# Patient Record
Sex: MALE | Race: OTHER | HISPANIC OR LATINO
[De-identification: names, ages, dates, MRNs, and addresses within clinical notes are randomized per-mention and may not be internally consistent; named-entity substitution may affect disease eponyms.]

---

## 2021-04-23 PROBLEM — Z00.00 ENCOUNTER FOR PREVENTIVE HEALTH EXAMINATION: Status: ACTIVE | Noted: 2021-04-23

## 2021-04-24 ENCOUNTER — APPOINTMENT (OUTPATIENT)
Dept: DISASTER EMERGENCY | Facility: CLINIC | Age: 81
End: 2021-04-24

## 2021-04-24 PROBLEM — I10 ESSENTIAL (PRIMARY) HYPERTENSION: Chronic | Status: ACTIVE | Noted: 2020-02-06

## 2021-04-24 PROBLEM — E78.5 HYPERLIPIDEMIA, UNSPECIFIED: Chronic | Status: ACTIVE | Noted: 2020-02-06

## 2021-04-24 PROBLEM — I50.22 CHRONIC SYSTOLIC (CONGESTIVE) HEART FAILURE: Chronic | Status: ACTIVE | Noted: 2020-02-06

## 2021-04-24 PROBLEM — E11.9 TYPE 2 DIABETES MELLITUS WITHOUT COMPLICATIONS: Chronic | Status: ACTIVE | Noted: 2020-02-06

## 2021-04-24 PROBLEM — F10.10 ALCOHOL ABUSE, UNCOMPLICATED: Chronic | Status: ACTIVE | Noted: 2020-02-06

## 2021-04-26 VITALS
DIASTOLIC BLOOD PRESSURE: 93 MMHG | SYSTOLIC BLOOD PRESSURE: 180 MMHG | HEART RATE: 70 BPM | OXYGEN SATURATION: 96 % | RESPIRATION RATE: 16 BRPM

## 2021-04-26 RX ORDER — CHLORHEXIDINE GLUCONATE 213 G/1000ML
1 SOLUTION TOPICAL ONCE
Refills: 0 | Status: DISCONTINUED | OUTPATIENT
Start: 2021-05-18 | End: 2021-05-19

## 2021-04-26 NOTE — H&P ADULT - NSICDXPASTMEDICALHX_GEN_ALL_CORE_FT
PAST MEDICAL HISTORY:  Alcohol abuse     Chronic systolic heart failure     Hyperlipidemia     Hypertension     Type 2 diabetes mellitus

## 2021-04-26 NOTE — H&P ADULT - HISTORY OF PRESENT ILLNESS
SKELETON    79 yo Male, PMHx HTN, Diabetes      SKELETON    79 yo Male, PMHx HTN, Hyperlipidemia, Diabetes, BPH presented to cardiologist, Dr Erin Barber, endorsing LE swelling and pain with exertion worsening over the last few months. Pt states his exercise tolerance is 3 blocks. Pt also feels lightheaded when he goes from a seated to standing position. Denies....     Pharm     [facility]    Cardiologist: Dr Rao  Escort:  Pharmacy:  COVD PCR: not performed    81 yo Male, PMHx HTN, Hyperlipidemia, Diabetes, BPH presented to cardiologist, Dr Erin Barber, endorsing LE swelling and pain with exertion worsening over the last few months. Pt states his exercise tolerance is 3 blocks. Pt also feels lightheaded when he goes from a seated to standing position. Denies....     Pharm NST on 03/13/21 showed a medium sized, mild to moderate severity, partially reversible defect exists in the proximal and distal inferior segments and apex.      SKELETON    Cardiologist: Dr Rao  Escort:  Pharmacy:  COVD PCR: not performed    79 yo Male, PMHx HTN, Hyperlipidemia, Diabetes, BPH, ?CHF (h/o says chronic systolic CHF, most recent EF 47% from NST 03/2021) presented to cardiologist, Dr Erin Barber, endorsing LE swelling and pain with exertion worsening over the last few months. Pt states his exercise tolerance is 3 blocks. Pt also feels lightheaded when he goes from a seated to standing position. Denies....     Pharm NST on 03/13/21 showed a medium sized, mild to moderate severity, partially reversible defect exists in the proximal and distal inferior segments and apex. Pt then underwent a diagnostic cardiac catheterization at [facility] on 04/23/21 with unsuccessful intervention to distal RCA 80%, severely calcified. Residual disease includes distal LAD diffusely disease 80%. Mild luminal irregularities in p/m LAD, RPDA. Right radial access.    In light of pt's risk factors, CCS Class II anginal symptoms, abnormal NST, and known distal RCA and LAD disease pt presents for recommended cardiac catheterization with atherectomy/PCI of known disease.  SKELETON    Cardiologist: Dr Rao  Escort:  Pharmacy:  COVD PCR: not performed    79 yo Male, PMHx HTN, Hyperlipidemia, Diabetes, BPH, ?CHF (h/o says chronic systolic CHF, most recent EF 47% from NST 03/2021) presented to cardiologist, Dr Erin Barber, endorsing LE swelling and pain with exertion worsening over the last few months. Pt states his exercise tolerance is 3 blocks. Pt also feels lightheaded when he goes from a seated to standing position. Denies....     Pharm NST on 03/13/21 showed a medium sized, mild to moderate severity, partially reversible defect exists in the proximal and distal inferior segments and apex. Pt then underwent a diagnostic cardiac catheterization at [facility] on 04/23/21 with unsuccessful intervention to distal RCA 80%, severely calcified. Residual disease includes distal LAD diffusely disease 80%. Mild luminal irregularities in p/m LAD, RPDA. Right radial access.    Of note pt was recently changed from Norvasc and Metoprolol to Carvedilol. Pt not on an ACE/ARB 2/2 elevated K as outpt.     In light of pt's risk factors, CCS Class II anginal symptoms, abnormal NST, and known distal RCA and LAD disease pt presents for recommended cardiac catheterization with atherectomy/PCI of known disease.  Cardiologist: Dr Jalen Clayort: wife  Pharmacy: ____confirm on arrival  COVD PCR: ****confirm with patient Monday before procedure        ****POOR HISTORIAN; NEEDS PRE-MED W/ SOLUCORTEF (severe shellfish allergy; did not send prednisone); CONFIRM MEDS AND PHARMACY; Consider CIWA if admitted, as pt drinks ~ 1 pint of Bacardi daily.         81 yo POOR HISTORIAN Male w/ Alcohol abuse and  PMHx HTN, HLD, DM, BPH, CHF (h/o says chronic systolic CHF, most recent EF 47% from NST 03/2021) presented to cardiologist, Dr Erin Barber, endorsing LE swelling and pain with exertion worsening over the last few months. Pt states his exercise tolerance is 3 blocks. Pt also feels lightheaded when he goes from a seated to standing position. Denies CP, palpitations, abdominal pain, orthopnea, PND, syncope.     Pharm NST on 03/13/21 showed a medium sized, mild to moderate severity, partially reversible defect exists in the proximal and distal inferior segments and apex. Pt then underwent a diagnostic cardiac catheterization at [facility] on 04/23/21 with unsuccessful intervention to distal RCA 80%, severely calcified. Residual disease includes distal LAD diffusely disease 80%. Mild luminal irregularities in p/m LAD, RPDA.     Of note pt was recently changed from Norvasc and Metoprolol to Carvedilol. Pt not on an ACE/ARB 2/2 elevated K as outpt.     In light of pt's risk factors, CCS Class II anginal symptoms, abnormal NST, and known distal RCA and LAD disease pt presents for recommended cardiac catheterization with atherectomy/PCI of known disease.   Cardiologist: Dr Jalen Clayort: wife  Pharmacy: ____confirm on arrival  COVD PCR: ****confirm with patient Monday before procedure        ****POOR HISTORIAN; NEEDS PRE-MED W/ SOLUCORTEF (severe shellfish allergy; did not send prednisone); CONFIRM MEDS AND PHARMACY; Consider CIWA if admitted, as pt drinks ~ 1 pint of Bacardi daily.         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 Pt states his exercise tolerance is 3 blocks. Pt also feels lightheaded when he goes from a seated to standing position. Denies CP, palpitations, abdominal pain, orthopnea, PND, syncope.  Of note pt was recently changed from Norvasc and Metoprolol to Carvedilol. Pt not on an ACE/ARB 2/2 elevated K as outpt.     Pharm NST on 03/13/21 showed a medium sized, mild to moderate severity, partially reversible defect exists in the proximal and distal inferior segments and apex. Pt then underwent a diagnostic cardiac catheterization at [facility] on 04/23/21 with unsuccessful intervention to distal RCA 80%, severely calcified. Residual disease includes distal LAD diffusely disease 80%. Mild luminal irregularities in p/m LAD, RPDA.     In light of pt's risk factors, CCS Class II anginal symptoms, abnormal NST, and known distal RCA and LAD disease pt presents for recommended cardiac catheterization with atherectomy/PCI of known disease.   Pt would like to reschedule from 5/10/21, alerted Tia RN    Cardiologist: Dr Rao  Escort: wife  Pharmacy: ____confirm on arrival  COVD PCR: ****confirm with patient Monday before procedure        ****POOR HISTORIAN; NEEDS PRE-MED W/ SOLUCORTEF (severe shellfish allergy; did not send prednisone); CONFIRM MEDS AND PHARMACY; Consider CIWA if admitted, as pt drinks ~ 1 pint of Bacardi daily.       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 Pt states his exercise tolerance is 3 blocks. Pt also feels lightheaded when he goes from a seated to standing position. Denies CP, SOB, palpitations, abdominal pain, orthopnea, PND, syncope.  Of note pt was recently changed from Norvasc and Metoprolol to Carvedilol. Pt not on an ACE/ARB 2/2 elevated K as outpt.     NST on 03/13/21 showed a medium sized, mild to moderate severity, partially reversible defect exists in the proximal and distal inferior segments and apex. Pt then underwent a diagnostic cardiac catheterization at [facility] on 04/23/21 with unsuccessful intervention to distal RCA 80%, severely calcified. Residual disease includes distal LAD diffusely disease 80%. Mild luminal irregularities in p/m LAD, RPDA.     In light of pt's risk factors, CCS Class II anginal symptoms, abnormal NST, known distal RCA and LAD disease pt presents for recommended cardiac catheterization with atherectomy/PCI of known disease.   Rescheduled from 5/10/21 to 5/17    Cardiologist: Dr Rao  Escort: wife  Pharmacy: ____confirm on arrival  COVD PCR: Neg in [facility] 5/17         ****POOR HISTORIAN; NEEDS PRE-MED W/ SOLUCORTEF (severe shellfish allergy; did not send prednisone); CONFIRM MEDS AND PHARMACY; Consider CIWA if admitted, as pt drinks ~ 1 pint of Bacardi daily.       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 Pt states his exercise tolerance is 3 blocks. Pt also feels lightheaded when he goes from a seated to standing position. Denies CP, SOB, palpitations, abdominal pain, orthopnea, PND, syncope.  Of note pt was recently changed from Norvasc and Metoprolol to Carvedilol. Pt not on an ACE/ARB 2/2 elevated K as outpt.  NST on 03/13/21 showed a medium sized, mild to moderate severity, partially reversible defect exists in the proximal and distal inferior segments and apex. Pt then underwent a diagnostic cardiac catheterization at [facility] on 04/23/21 with unsuccessful intervention to distal RCA 80%, severely calcified. Residual disease includes distal LAD diffusely disease 80%. Mild luminal irregularities in p/m LAD, RPDA.     In light of pt's risk factors, CCS Class II anginal symptoms, abnormal NST, known distal RCA and LAD disease pt presents for recommended cardiac catheterization with atherectomy/PCI of known disease.   Rescheduled from 5/10/21 to 5/17    Cardiologist: Dr Rao  Escort: wife  Pharmacy: in [facility] PCR: Neg in [facility]IE 5/17     SENT PREDNISONE 50mg to patients pharmacy- unclear if patient has an allergy to shellfish/contrast dye, poor historian. Patient agreeable to take as prescribed.   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 Pt states his exercise tolerance is 3 blocks. Pt also feels lightheaded when he goes from a seated to standing position. Denies CP, SOB, palpitations, abdominal pain, orthopnea, PND, syncope.  Of note pt was recently changed from Norvasc and Metoprolol to Carvedilol. Pt not on an ACE/ARB 2/2 elevated K as outpt.  NST on 03/13/21 showed a medium sized, mild to moderate severity, partially reversible defect exists in the proximal and distal inferior segments and apex. Pt then underwent a diagnostic cardiac catheterization at [facility] on 04/23/21 with unsuccessful intervention to distal RCA 80%, severely calcified. Residual disease includes distal LAD diffusely disease 80%. Mild luminal irregularities in p/m LAD, RPDA.     In light of pt's risk factors, CCS Class II anginal symptoms, abnormal NST, known distal RCA and LAD disease pt presents for recommended cardiac catheterization with atherectomy/PCI of known disease.       Cardiologist: Dr Rao  Escort: wife  Pharmacy: in [facility] PCR: Neg in [facility] 5/17     SENT PREDNISONE 50mg to patients pharmacy- unclear if patient has an allergy to shellfish/contrast dye, poor historian. Patient agreeable to take as prescribed.   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 Pt states his exercise tolerance is 3 blocks. Pt also feels lightheaded when he goes from a seated to standing position. Denies CP, SOB, palpitations, abdominal pain, orthopnea, PND, syncope.  Of note pt was recently changed from Norvasc and Metoprolol to Carvedilol. Pt not on an ACE/ARB 2/2 elevated K as outpt.  NST on 03/13/21 showed a medium sized, mild to moderate severity, partially reversible defect exists in the proximal and distal inferior segments and apex. Pt then underwent a diagnostic cardiac catheterization at [facility] on 04/23/21 with unsuccessful intervention to distal RCA 80%, severely calcified. Residual disease includes distal LAD diffusely disease 80%. Mild luminal irregularities in p/m LAD, RPDA.     In light of pt's risk factors, CCS Class II anginal symptoms, abnormal NST, known distal RCA and LAD disease pt presents for recommended cardiac catheterization with atherectomy/PCI of known disease.       Cardiologist: Dr Rao  Escort: wife  Pharmacy: in [facility] PCR: Neg in [facility] 5/17     contrast dye allergy : rxn (rash/itching); prednisone 50 mg Po x 3 sent to pt pharmacy.   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 Pt states his exercise tolerance is 3 blocks. Pt also feels lightheaded when he goes from a seated to standing position. Denies CP, SOB, palpitations, abdominal pain, orthopnea, PND, syncope.  Of note pt was recently changed from Norvasc and Metoprolol to Carvedilol. Pt not on an ACE/ARB 2/2 elevated K as outpt.  NST on 03/13/21 showed a medium sized, mild to moderate severity, partially reversible defect exists in the proximal and distal inferior segments and apex. Pt then underwent a diagnostic cardiac catheterization at [facility] on 04/23/21 with unsuccessful intervention to distal RCA 80%, severely calcified. Residual disease includes distal LAD diffusely disease 80%. Mild luminal irregularities in p/m LAD, RPDA.     In light of pt's risk factors, CCS Class II anginal symptoms, abnormal NST, known distal RCA and LAD disease pt presents for recommended cardiac catheterization with atherectomy/PCI of known disease.

## 2021-04-26 NOTE — H&P ADULT - RS GEN PE MLT RESP DETAILS PC
normal/airway patent/breath sounds equal/good air movement/respirations non-labored/clear to auscultation bilaterally/no chest wall tenderness/no intercostal retractions/no rales/no rhonchi

## 2021-04-26 NOTE — H&P ADULT - NSHPSOCIALHISTORY_GEN_ALL_CORE
former tobacco user; smoked 1/2 PPD X 1 week for 8-9 years  current daily ETOH user; drinks 9 ounce of bacardi daily; last use yesterday  denies any illicit drug use

## 2021-04-26 NOTE — H&P ADULT - ASSESSMENT
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 Residual disease includes distal LAD diffusely disease 80%. Mild luminal irregularities in p/m LAD, RPDA who now presents for staged intervention of known distal RCA and LAD disease    H/H . Pt denies bleeding, GI bleeding, hematemesis, hematuria, BRBPR or melena . ASA … and Plavix … pre-cath.  Cr. IV NS@ cc/hr pre-cath.    Risks & benefits of procedure and alternative therapy have been explained to the patient including but not limited to: allergic reaction, bleeding w/possible need for blood transfusion, infection, renal and vascular compromise, limb damage, arrhythmia, stroke, vessel dissection/perforation, Myocardial infarction, emergent CABG. 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 Residual disease includes distal LAD diffusely disease 80%. Mild luminal irregularities in p/m LAD, RPDA who now presents for staged intervention of known distal RCA and LAD disease    H/H 14/42. Pt denies bleeding, GI bleeding, hematemesis, hematuria, BRBPR or melena . Pt reports being compliant with home DAPT. Pt givem ASA 81 mg Po x 1 and Plavix 75 mg PO x 1 pre-cath.  Cr. IV NS@ 75 cc/hr pre-cath.  contrast dye allergy : rxn (rash/itching); prednisone 50 mg Po x 3 sent to pt pharmacy; last took at 5: 30 am today; pt given additional prednisone 50 mg Po x1 and Benadryl 50 IV X 1 on call to cath lab   Of note: he took lasix 1 week ago and has no more prescription for it.     Risks & benefits of procedure and alternative therapy have been explained to the patient including but not limited to: allergic reaction, bleeding w/possible need for blood transfusion, infection, renal and vascular compromise, limb damage, arrhythmia, stroke, vessel dissection/perforation, Myocardial infarction, emergent CABG. 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 on 04/23/21 with unsuccessful intervention to distal RCA 80%, severely calcified. Residual disease includes distal LAD diffusely disease 80%. Mild luminal irregularities in p/m LAD, RPDA who now presents for staged intervention of known distal RCA and LAD disease    H/H 14/42. Pt denies bleeding, GI bleeding, hematemesis, hematuria, BRBPR or melena . Pt reports being compliant with home DAPT. Pt givem ASA 81 mg Po x 1 and Plavix 75 mg PO x 1 pre-cath.  Cr. 1.24 IV NS@ 75 cc/hr pre-cath.  contrast dye allergy : rxn (rash/itching); prednisone 50 mg Po x 3 sent to pt pharmacy; last took at 5: 30 am today; pt given additional prednisone 50 mg Po x1 and Benadryl 50 IV X 1 on call to cath lab   Of note: he took lasix 1 week ago and has no more prescription for it.   /93 on arrival; did not take home meds today am; pt ordered for home coreg 3.125 mg PO X 1.     Risks & benefits of procedure and alternative therapy have been explained to the patient including but not limited to: allergic reaction, bleeding w/possible need for blood transfusion, infection, renal and vascular compromise, limb damage, arrhythmia, stroke, vessel dissection/perforation, Myocardial infarction, emergent CABG. Informed consent obtained and in chart

## 2021-04-26 NOTE — H&P ADULT - NSHPLABSRESULTS_GEN_ALL_CORE
14.2   7.96  )-----------( 191      ( 18 May 2021 14:15 )             41.9   PT/INR - ( 18 May 2021 14:15 )   PT: 11.2 sec;   INR: 0.93          PTT - ( 18 May 2021 14:15 )  PTT:32.8 sec 14.2   7.96  )-----------( 191      ( 18 May 2021 14:15 )             41.9   PT/INR - ( 18 May 2021 14:15 )   PT: 11.2 sec;   INR: 0.93          PTT - ( 18 May 2021 14:15 )  PTT:32.8 sec    05-18    137  |  98  |  24<H>  ----------------------------<  275<H>  4.4   |  26  |  1.24    Ca    10.3      18 May 2021 14:15    TPro  8.0  /  Alb  4.2  /  TBili  0.3  /  DBili  x   /  AST  15  /  ALT  16  /  AlkPhos  69  05-18

## 2021-05-14 DIAGNOSIS — Z01.818 ENCOUNTER FOR OTHER PREPROCEDURAL EXAMINATION: ICD-10-CM

## 2021-05-15 ENCOUNTER — APPOINTMENT (OUTPATIENT)
Dept: DISASTER EMERGENCY | Facility: CLINIC | Age: 81
End: 2021-05-15

## 2021-05-16 LAB — SARS-COV-2 N GENE NPH QL NAA+PROBE: NOT DETECTED

## 2021-05-17 RX ORDER — DIPHENHYDRAMINE HCL 50 MG
50 CAPSULE ORAL ONCE
Refills: 0 | Status: COMPLETED | OUTPATIENT
Start: 2021-05-18 | End: 2021-05-18

## 2021-05-18 ENCOUNTER — INPATIENT (INPATIENT)
Facility: HOSPITAL | Age: 81
LOS: 0 days | Discharge: HOME | DRG: 247 | End: 2021-05-19
Attending: INTERNAL MEDICINE | Admitting: HOSPITALIST
Payer: MEDICARE

## 2021-05-18 LAB
A1C WITH ESTIMATED AVERAGE GLUCOSE RESULT: 7 % — HIGH (ref 4–5.6)
ALBUMIN SERPL ELPH-MCNC: 4.2 G/DL — SIGNIFICANT CHANGE UP (ref 3.3–5)
ALP SERPL-CCNC: 69 U/L — SIGNIFICANT CHANGE UP (ref 40–120)
ALT FLD-CCNC: 16 U/L — SIGNIFICANT CHANGE UP (ref 10–45)
ANION GAP SERPL CALC-SCNC: 13 MMOL/L — SIGNIFICANT CHANGE UP (ref 5–17)
APTT BLD: 32.8 SEC — SIGNIFICANT CHANGE UP (ref 27.5–35.5)
AST SERPL-CCNC: 15 U/L — SIGNIFICANT CHANGE UP (ref 10–40)
BASOPHILS # BLD AUTO: 0 K/UL — SIGNIFICANT CHANGE UP (ref 0–0.2)
BASOPHILS NFR BLD AUTO: 0 % — SIGNIFICANT CHANGE UP (ref 0–2)
BILIRUB SERPL-MCNC: 0.3 MG/DL — SIGNIFICANT CHANGE UP (ref 0.2–1.2)
BUN SERPL-MCNC: 24 MG/DL — HIGH (ref 7–23)
CALCIUM SERPL-MCNC: 10.3 MG/DL — SIGNIFICANT CHANGE UP (ref 8.4–10.5)
CHLORIDE SERPL-SCNC: 98 MMOL/L — SIGNIFICANT CHANGE UP (ref 96–108)
CHOLEST SERPL-MCNC: 180 MG/DL — SIGNIFICANT CHANGE UP
CK MB CFR SERPL CALC: 5.4 NG/ML — SIGNIFICANT CHANGE UP (ref 0–6.7)
CK SERPL-CCNC: 101 U/L — SIGNIFICANT CHANGE UP (ref 30–200)
CO2 SERPL-SCNC: 26 MMOL/L — SIGNIFICANT CHANGE UP (ref 22–31)
CREAT SERPL-MCNC: 1.24 MG/DL — SIGNIFICANT CHANGE UP (ref 0.5–1.3)
EOSINOPHIL # BLD AUTO: 0 K/UL — SIGNIFICANT CHANGE UP (ref 0–0.5)
EOSINOPHIL NFR BLD AUTO: 0 % — SIGNIFICANT CHANGE UP (ref 0–6)
ESTIMATED AVERAGE GLUCOSE: 154 MG/DL — HIGH (ref 68–114)
GLUCOSE BLDC GLUCOMTR-MCNC: 243 MG/DL — HIGH (ref 70–99)
GLUCOSE BLDC GLUCOMTR-MCNC: 246 MG/DL — HIGH (ref 70–99)
GLUCOSE BLDC GLUCOMTR-MCNC: 246 MG/DL — HIGH (ref 70–99)
GLUCOSE BLDC GLUCOMTR-MCNC: 300 MG/DL — HIGH (ref 70–99)
GLUCOSE SERPL-MCNC: 275 MG/DL — HIGH (ref 70–99)
HCT VFR BLD CALC: 41.9 % — SIGNIFICANT CHANGE UP (ref 39–50)
HDLC SERPL-MCNC: 116 MG/DL — SIGNIFICANT CHANGE UP
HGB BLD-MCNC: 14.2 G/DL — SIGNIFICANT CHANGE UP (ref 13–17)
IMM GRANULOCYTES NFR BLD AUTO: 0.6 % — SIGNIFICANT CHANGE UP (ref 0–1.5)
INR BLD: 0.93 — SIGNIFICANT CHANGE UP (ref 0.88–1.16)
LIPID PNL WITH DIRECT LDL SERPL: 55 MG/DL — SIGNIFICANT CHANGE UP
LYMPHOCYTES # BLD AUTO: 0.52 K/UL — LOW (ref 1–3.3)
LYMPHOCYTES # BLD AUTO: 6.5 % — LOW (ref 13–44)
MCHC RBC-ENTMCNC: 32.9 PG — SIGNIFICANT CHANGE UP (ref 27–34)
MCHC RBC-ENTMCNC: 33.9 GM/DL — SIGNIFICANT CHANGE UP (ref 32–36)
MCV RBC AUTO: 97.2 FL — SIGNIFICANT CHANGE UP (ref 80–100)
MONOCYTES # BLD AUTO: 0.1 K/UL — SIGNIFICANT CHANGE UP (ref 0–0.9)
MONOCYTES NFR BLD AUTO: 1.3 % — LOW (ref 2–14)
NEUTROPHILS # BLD AUTO: 7.29 K/UL — SIGNIFICANT CHANGE UP (ref 1.8–7.4)
NEUTROPHILS NFR BLD AUTO: 91.6 % — HIGH (ref 43–77)
NON HDL CHOLESTEROL: 64 MG/DL — SIGNIFICANT CHANGE UP
NRBC # BLD: 0 /100 WBCS — SIGNIFICANT CHANGE UP (ref 0–0)
PLATELET # BLD AUTO: 191 K/UL — SIGNIFICANT CHANGE UP (ref 150–400)
POTASSIUM SERPL-MCNC: 4.4 MMOL/L — SIGNIFICANT CHANGE UP (ref 3.5–5.3)
POTASSIUM SERPL-SCNC: 4.4 MMOL/L — SIGNIFICANT CHANGE UP (ref 3.5–5.3)
PROT SERPL-MCNC: 8 G/DL — SIGNIFICANT CHANGE UP (ref 6–8.3)
PROTHROM AB SERPL-ACNC: 11.2 SEC — SIGNIFICANT CHANGE UP (ref 10.6–13.6)
RBC # BLD: 4.31 M/UL — SIGNIFICANT CHANGE UP (ref 4.2–5.8)
RBC # FLD: 13.3 % — SIGNIFICANT CHANGE UP (ref 10.3–14.5)
SODIUM SERPL-SCNC: 137 MMOL/L — SIGNIFICANT CHANGE UP (ref 135–145)
TRIGL SERPL-MCNC: 46 MG/DL — SIGNIFICANT CHANGE UP
WBC # BLD: 7.96 K/UL — SIGNIFICANT CHANGE UP (ref 3.8–10.5)
WBC # FLD AUTO: 7.96 K/UL — SIGNIFICANT CHANGE UP (ref 3.8–10.5)

## 2021-05-18 PROCEDURE — 93010 ELECTROCARDIOGRAM REPORT: CPT

## 2021-05-18 PROCEDURE — 92933 PRQ TRLML C ATHRC ST ANGIOP1: CPT | Mod: RC

## 2021-05-18 PROCEDURE — 93454 CORONARY ARTERY ANGIO S&I: CPT | Mod: 26,59

## 2021-05-18 PROCEDURE — 99152 MOD SED SAME PHYS/QHP 5/>YRS: CPT

## 2021-05-18 RX ORDER — CHOLECALCIFEROL (VITAMIN D3) 125 MCG
0 CAPSULE ORAL
Qty: 0 | Refills: 0 | DISCHARGE

## 2021-05-18 RX ORDER — SODIUM CHLORIDE 9 MG/ML
500 INJECTION INTRAMUSCULAR; INTRAVENOUS; SUBCUTANEOUS
Refills: 0 | Status: COMPLETED | OUTPATIENT
Start: 2021-05-18 | End: 2021-05-18

## 2021-05-18 RX ORDER — OXYBUTYNIN CHLORIDE 5 MG
0 TABLET ORAL
Qty: 0 | Refills: 0 | DISCHARGE

## 2021-05-18 RX ORDER — ISOSORBIDE MONONITRATE 60 MG/1
30 TABLET, EXTENDED RELEASE ORAL DAILY
Refills: 0 | Status: DISCONTINUED | OUTPATIENT
Start: 2021-05-18 | End: 2021-05-19

## 2021-05-18 RX ORDER — ISOSORBIDE MONONITRATE 60 MG/1
1 TABLET, EXTENDED RELEASE ORAL
Qty: 0 | Refills: 0 | DISCHARGE

## 2021-05-18 RX ORDER — LABETALOL HCL 100 MG
10 TABLET ORAL ONCE
Refills: 0 | Status: COMPLETED | OUTPATIENT
Start: 2021-05-18 | End: 2021-05-18

## 2021-05-18 RX ORDER — ASPIRIN/CALCIUM CARB/MAGNESIUM 324 MG
81 TABLET ORAL ONCE
Refills: 0 | Status: COMPLETED | OUTPATIENT
Start: 2021-05-18 | End: 2021-05-18

## 2021-05-18 RX ORDER — CARVEDILOL PHOSPHATE 80 MG/1
3.12 CAPSULE, EXTENDED RELEASE ORAL EVERY 12 HOURS
Refills: 0 | Status: DISCONTINUED | OUTPATIENT
Start: 2021-05-18 | End: 2021-05-19

## 2021-05-18 RX ORDER — FOLIC ACID 0.8 MG
1 TABLET ORAL DAILY
Refills: 0 | Status: DISCONTINUED | OUTPATIENT
Start: 2021-05-18 | End: 2021-05-19

## 2021-05-18 RX ORDER — CLOPIDOGREL BISULFATE 75 MG/1
75 TABLET, FILM COATED ORAL DAILY
Refills: 0 | Status: DISCONTINUED | OUTPATIENT
Start: 2021-05-19 | End: 2021-05-19

## 2021-05-18 RX ORDER — FINASTERIDE 5 MG/1
5 TABLET, FILM COATED ORAL DAILY
Refills: 0 | Status: DISCONTINUED | OUTPATIENT
Start: 2021-05-18 | End: 2021-05-19

## 2021-05-18 RX ORDER — CARVEDILOL PHOSPHATE 80 MG/1
3.12 CAPSULE, EXTENDED RELEASE ORAL ONCE
Refills: 0 | Status: COMPLETED | OUTPATIENT
Start: 2021-05-18 | End: 2021-05-18

## 2021-05-18 RX ORDER — ASPIRIN/CALCIUM CARB/MAGNESIUM 324 MG
81 TABLET ORAL DAILY
Refills: 0 | Status: DISCONTINUED | OUTPATIENT
Start: 2021-05-19 | End: 2021-05-19

## 2021-05-18 RX ORDER — INSULIN LISPRO 100/ML
VIAL (ML) SUBCUTANEOUS
Refills: 0 | Status: DISCONTINUED | OUTPATIENT
Start: 2021-05-18 | End: 2021-05-19

## 2021-05-18 RX ORDER — THIAMINE MONONITRATE (VIT B1) 100 MG
1 TABLET ORAL
Qty: 0 | Refills: 0 | DISCHARGE

## 2021-05-18 RX ORDER — SODIUM CHLORIDE 9 MG/ML
1000 INJECTION, SOLUTION INTRAVENOUS
Refills: 0 | Status: DISCONTINUED | OUTPATIENT
Start: 2021-05-18 | End: 2021-05-19

## 2021-05-18 RX ORDER — SODIUM CHLORIDE 9 MG/ML
500 INJECTION INTRAMUSCULAR; INTRAVENOUS; SUBCUTANEOUS
Refills: 0 | Status: DISCONTINUED | OUTPATIENT
Start: 2021-05-18 | End: 2021-05-19

## 2021-05-18 RX ORDER — FINASTERIDE 5 MG/1
1 TABLET, FILM COATED ORAL
Qty: 0 | Refills: 0 | DISCHARGE

## 2021-05-18 RX ORDER — DEXTROSE 50 % IN WATER 50 %
25 SYRINGE (ML) INTRAVENOUS ONCE
Refills: 0 | Status: DISCONTINUED | OUTPATIENT
Start: 2021-05-18 | End: 2021-05-19

## 2021-05-18 RX ORDER — DEXTROSE 50 % IN WATER 50 %
12.5 SYRINGE (ML) INTRAVENOUS ONCE
Refills: 0 | Status: DISCONTINUED | OUTPATIENT
Start: 2021-05-18 | End: 2021-05-19

## 2021-05-18 RX ORDER — FUROSEMIDE 40 MG
1 TABLET ORAL
Qty: 0 | Refills: 0 | DISCHARGE

## 2021-05-18 RX ORDER — FOLIC ACID 0.8 MG
1 TABLET ORAL
Qty: 0 | Refills: 0 | DISCHARGE

## 2021-05-18 RX ORDER — CLOPIDOGREL BISULFATE 75 MG/1
75 TABLET, FILM COATED ORAL ONCE
Refills: 0 | Status: COMPLETED | OUTPATIENT
Start: 2021-05-18 | End: 2021-05-18

## 2021-05-18 RX ORDER — PREGABALIN 225 MG/1
1 CAPSULE ORAL
Qty: 0 | Refills: 0 | DISCHARGE

## 2021-05-18 RX ORDER — METFORMIN HYDROCHLORIDE 850 MG/1
1 TABLET ORAL
Qty: 0 | Refills: 0 | DISCHARGE

## 2021-05-18 RX ORDER — DEXTROSE 50 % IN WATER 50 %
15 SYRINGE (ML) INTRAVENOUS ONCE
Refills: 0 | Status: DISCONTINUED | OUTPATIENT
Start: 2021-05-18 | End: 2021-05-19

## 2021-05-18 RX ORDER — OXYBUTYNIN CHLORIDE 5 MG
5 TABLET ORAL DAILY
Refills: 0 | Status: DISCONTINUED | OUTPATIENT
Start: 2021-05-18 | End: 2021-05-19

## 2021-05-18 RX ORDER — ATORVASTATIN CALCIUM 80 MG/1
20 TABLET, FILM COATED ORAL AT BEDTIME
Refills: 0 | Status: DISCONTINUED | OUTPATIENT
Start: 2021-05-18 | End: 2021-05-19

## 2021-05-18 RX ORDER — GLUCAGON INJECTION, SOLUTION 0.5 MG/.1ML
1 INJECTION, SOLUTION SUBCUTANEOUS ONCE
Refills: 0 | Status: DISCONTINUED | OUTPATIENT
Start: 2021-05-18 | End: 2021-05-19

## 2021-05-18 RX ORDER — THIAMINE MONONITRATE (VIT B1) 100 MG
100 TABLET ORAL DAILY
Refills: 0 | Status: DISCONTINUED | OUTPATIENT
Start: 2021-05-18 | End: 2021-05-19

## 2021-05-18 RX ORDER — FUROSEMIDE 40 MG
40 TABLET ORAL DAILY
Refills: 0 | Status: DISCONTINUED | OUTPATIENT
Start: 2021-05-19 | End: 2021-05-19

## 2021-05-18 RX ORDER — TAMSULOSIN HYDROCHLORIDE 0.4 MG/1
0.4 CAPSULE ORAL AT BEDTIME
Refills: 0 | Status: DISCONTINUED | OUTPATIENT
Start: 2021-05-18 | End: 2021-05-19

## 2021-05-18 RX ORDER — TAMSULOSIN HYDROCHLORIDE 0.4 MG/1
1 CAPSULE ORAL
Qty: 0 | Refills: 0 | DISCHARGE

## 2021-05-18 RX ADMIN — Medication 3: at 22:50

## 2021-05-18 RX ADMIN — SODIUM CHLORIDE 75 MILLILITER(S): 9 INJECTION INTRAMUSCULAR; INTRAVENOUS; SUBCUTANEOUS at 18:38

## 2021-05-18 RX ADMIN — CARVEDILOL PHOSPHATE 3.12 MILLIGRAM(S): 80 CAPSULE, EXTENDED RELEASE ORAL at 16:04

## 2021-05-18 RX ADMIN — TAMSULOSIN HYDROCHLORIDE 0.4 MILLIGRAM(S): 0.4 CAPSULE ORAL at 22:51

## 2021-05-18 RX ADMIN — CARVEDILOL PHOSPHATE 3.12 MILLIGRAM(S): 80 CAPSULE, EXTENDED RELEASE ORAL at 22:50

## 2021-05-18 RX ADMIN — Medication 50 MILLIGRAM(S): at 16:04

## 2021-05-18 RX ADMIN — SODIUM CHLORIDE 75 MILLILITER(S): 9 INJECTION INTRAMUSCULAR; INTRAVENOUS; SUBCUTANEOUS at 15:11

## 2021-05-18 RX ADMIN — CLOPIDOGREL BISULFATE 75 MILLIGRAM(S): 75 TABLET, FILM COATED ORAL at 15:11

## 2021-05-18 RX ADMIN — Medication 50 MILLIGRAM(S): at 17:15

## 2021-05-18 RX ADMIN — Medication 81 MILLIGRAM(S): at 15:10

## 2021-05-18 RX ADMIN — ATORVASTATIN CALCIUM 20 MILLIGRAM(S): 80 TABLET, FILM COATED ORAL at 22:50

## 2021-05-18 RX ADMIN — SODIUM CHLORIDE 75 MILLILITER(S): 9 INJECTION INTRAMUSCULAR; INTRAVENOUS; SUBCUTANEOUS at 18:35

## 2021-05-18 NOTE — CHART NOTE - NSCHARTNOTEFT_GEN_A_CORE
Interventional Cardiology PA Sheath Pull Note    Pt without complaints. VSS    Pre-Sheath Removal:    [x] Right     [ ] Left          [x ] Groin    [ ] Brachial    [x ] 5Fr      [ ] 6Fr    [ ] 7Fr     [ ] 8Fr    [ ] Arterial  [x ] Venous sheath in place    [ ] Hematoma        [ ] Bleed    Pulses:    [x ] Right     [ ] Left       DP:  [ ]  Doppler   [ ]  Faint    [ x]   1+    [ ] 2+  [x ] Right     [ ] Left       PT:  [ ]  Doppler   [x ]  Faint    [ ]   1+    [ ] 2+       Hemostasis Achieved with:                12 minutes manual pressure    Vasovagal Reaction: No    Meds Given: None      Post-Sheath Removal:    [ x] Right     [ ] Left          [x ] Groin    [ ] Brachial    [ ] Hematoma        [ ] Bleed       [ ] Bruit    Pulses:    [x ] Right     [ ] Left       DP:  [ ]  Doppler   [ ]  Faint    [ x]   1+    [ ] 2+  [ x] Right     [ ] Left       PT:  [ ]  Doppler   [x ]  Faint    [ ]   1+    [ ] 2+     A/P:  s/p [ ] Dx Cath      [ ] IVUS/FFR     [ ] PTA/STENT       [ x] PTCA/STENT    -Continue bedrest (pt given instructions)  -Continue to monitor

## 2021-05-19 ENCOUNTER — TRANSCRIPTION ENCOUNTER (OUTPATIENT)
Age: 81
End: 2021-05-19

## 2021-05-19 VITALS — DIASTOLIC BLOOD PRESSURE: 77 MMHG | SYSTOLIC BLOOD PRESSURE: 142 MMHG | HEART RATE: 68 BPM

## 2021-05-19 LAB
ANION GAP SERPL CALC-SCNC: 12 MMOL/L — SIGNIFICANT CHANGE UP (ref 5–17)
BASOPHILS # BLD AUTO: 0.01 K/UL — SIGNIFICANT CHANGE UP (ref 0–0.2)
BASOPHILS NFR BLD AUTO: 0.1 % — SIGNIFICANT CHANGE UP (ref 0–2)
BUN SERPL-MCNC: 26 MG/DL — HIGH (ref 7–23)
CALCIUM SERPL-MCNC: 9.3 MG/DL — SIGNIFICANT CHANGE UP (ref 8.4–10.5)
CHLORIDE SERPL-SCNC: 100 MMOL/L — SIGNIFICANT CHANGE UP (ref 96–108)
CO2 SERPL-SCNC: 28 MMOL/L — SIGNIFICANT CHANGE UP (ref 22–31)
COVID-19 SPIKE DOMAIN AB INTERP: POSITIVE
COVID-19 SPIKE DOMAIN ANTIBODY RESULT: >250 U/ML — HIGH
CREAT SERPL-MCNC: 1.17 MG/DL — SIGNIFICANT CHANGE UP (ref 0.5–1.3)
EOSINOPHIL # BLD AUTO: 0 K/UL — SIGNIFICANT CHANGE UP (ref 0–0.5)
EOSINOPHIL NFR BLD AUTO: 0 % — SIGNIFICANT CHANGE UP (ref 0–6)
GLUCOSE BLDC GLUCOMTR-MCNC: 188 MG/DL — HIGH (ref 70–99)
GLUCOSE BLDC GLUCOMTR-MCNC: 227 MG/DL — HIGH (ref 70–99)
GLUCOSE SERPL-MCNC: 231 MG/DL — HIGH (ref 70–99)
HCT VFR BLD CALC: 42.3 % — SIGNIFICANT CHANGE UP (ref 39–50)
HGB BLD-MCNC: 14.1 G/DL — SIGNIFICANT CHANGE UP (ref 13–17)
IMM GRANULOCYTES NFR BLD AUTO: 0.5 % — SIGNIFICANT CHANGE UP (ref 0–1.5)
LYMPHOCYTES # BLD AUTO: 0.64 K/UL — LOW (ref 1–3.3)
LYMPHOCYTES # BLD AUTO: 7 % — LOW (ref 13–44)
MAGNESIUM SERPL-MCNC: 1.5 MG/DL — LOW (ref 1.6–2.6)
MCHC RBC-ENTMCNC: 32.3 PG — SIGNIFICANT CHANGE UP (ref 27–34)
MCHC RBC-ENTMCNC: 33.3 GM/DL — SIGNIFICANT CHANGE UP (ref 32–36)
MCV RBC AUTO: 97 FL — SIGNIFICANT CHANGE UP (ref 80–100)
MONOCYTES # BLD AUTO: 0.63 K/UL — SIGNIFICANT CHANGE UP (ref 0–0.9)
MONOCYTES NFR BLD AUTO: 6.9 % — SIGNIFICANT CHANGE UP (ref 2–14)
NEUTROPHILS # BLD AUTO: 7.85 K/UL — HIGH (ref 1.8–7.4)
NEUTROPHILS NFR BLD AUTO: 85.5 % — HIGH (ref 43–77)
NRBC # BLD: 0 /100 WBCS — SIGNIFICANT CHANGE UP (ref 0–0)
PLATELET # BLD AUTO: 187 K/UL — SIGNIFICANT CHANGE UP (ref 150–400)
POTASSIUM SERPL-MCNC: 4.4 MMOL/L — SIGNIFICANT CHANGE UP (ref 3.5–5.3)
POTASSIUM SERPL-SCNC: 4.4 MMOL/L — SIGNIFICANT CHANGE UP (ref 3.5–5.3)
RBC # BLD: 4.36 M/UL — SIGNIFICANT CHANGE UP (ref 4.2–5.8)
RBC # FLD: 13.3 % — SIGNIFICANT CHANGE UP (ref 10.3–14.5)
SARS-COV-2 IGG+IGM SERPL QL IA: >250 U/ML — HIGH
SARS-COV-2 IGG+IGM SERPL QL IA: POSITIVE
SODIUM SERPL-SCNC: 140 MMOL/L — SIGNIFICANT CHANGE UP (ref 135–145)
WBC # BLD: 9.18 K/UL — SIGNIFICANT CHANGE UP (ref 3.8–10.5)
WBC # FLD AUTO: 9.18 K/UL — SIGNIFICANT CHANGE UP (ref 3.8–10.5)

## 2021-05-19 PROCEDURE — 36415 COLL VENOUS BLD VENIPUNCTURE: CPT

## 2021-05-19 PROCEDURE — 80048 BASIC METABOLIC PNL TOTAL CA: CPT

## 2021-05-19 PROCEDURE — C1725: CPT

## 2021-05-19 PROCEDURE — C1889: CPT

## 2021-05-19 PROCEDURE — 82962 GLUCOSE BLOOD TEST: CPT

## 2021-05-19 PROCEDURE — 83735 ASSAY OF MAGNESIUM: CPT

## 2021-05-19 PROCEDURE — 85730 THROMBOPLASTIN TIME PARTIAL: CPT

## 2021-05-19 PROCEDURE — 93005 ELECTROCARDIOGRAM TRACING: CPT

## 2021-05-19 PROCEDURE — C1769: CPT

## 2021-05-19 PROCEDURE — 80061 LIPID PANEL: CPT

## 2021-05-19 PROCEDURE — 86769 SARS-COV-2 COVID-19 ANTIBODY: CPT

## 2021-05-19 PROCEDURE — 85610 PROTHROMBIN TIME: CPT

## 2021-05-19 PROCEDURE — C1887: CPT

## 2021-05-19 PROCEDURE — 83036 HEMOGLOBIN GLYCOSYLATED A1C: CPT

## 2021-05-19 PROCEDURE — 82550 ASSAY OF CK (CPK): CPT

## 2021-05-19 PROCEDURE — C1724: CPT

## 2021-05-19 PROCEDURE — C1874: CPT

## 2021-05-19 PROCEDURE — 85025 COMPLETE CBC W/AUTO DIFF WBC: CPT

## 2021-05-19 PROCEDURE — 99239 HOSP IP/OBS DSCHRG MGMT >30: CPT

## 2021-05-19 PROCEDURE — C1894: CPT

## 2021-05-19 PROCEDURE — 80053 COMPREHEN METABOLIC PANEL: CPT

## 2021-05-19 PROCEDURE — 82553 CREATINE MB FRACTION: CPT

## 2021-05-19 RX ORDER — ATORVASTATIN CALCIUM 80 MG/1
1 TABLET, FILM COATED ORAL
Qty: 0 | Refills: 0 | DISCHARGE

## 2021-05-19 RX ORDER — ASPIRIN/CALCIUM CARB/MAGNESIUM 324 MG
1 TABLET ORAL
Qty: 30 | Refills: 11
Start: 2021-05-19 | End: 2022-05-13

## 2021-05-19 RX ORDER — CARVEDILOL PHOSPHATE 80 MG/1
1 CAPSULE, EXTENDED RELEASE ORAL
Qty: 60 | Refills: 2
Start: 2021-05-19 | End: 2021-08-16

## 2021-05-19 RX ORDER — ASPIRIN/CALCIUM CARB/MAGNESIUM 324 MG
0 TABLET ORAL
Qty: 0 | Refills: 0 | DISCHARGE

## 2021-05-19 RX ORDER — CARVEDILOL PHOSPHATE 80 MG/1
1 CAPSULE, EXTENDED RELEASE ORAL
Qty: 0 | Refills: 0 | DISCHARGE

## 2021-05-19 RX ORDER — CLOPIDOGREL BISULFATE 75 MG/1
1 TABLET, FILM COATED ORAL
Qty: 30 | Refills: 11
Start: 2021-05-19 | End: 2022-05-13

## 2021-05-19 RX ORDER — MAGNESIUM SULFATE 500 MG/ML
2 VIAL (ML) INJECTION ONCE
Refills: 0 | Status: COMPLETED | OUTPATIENT
Start: 2021-05-19 | End: 2021-05-19

## 2021-05-19 RX ORDER — ATORVASTATIN CALCIUM 80 MG/1
1 TABLET, FILM COATED ORAL
Qty: 30 | Refills: 2
Start: 2021-05-19 | End: 2021-08-16

## 2021-05-19 RX ORDER — CLOPIDOGREL BISULFATE 75 MG/1
1 TABLET, FILM COATED ORAL
Qty: 0 | Refills: 0 | DISCHARGE

## 2021-05-19 RX ADMIN — Medication 50 GRAM(S): at 08:57

## 2021-05-19 RX ADMIN — FINASTERIDE 5 MILLIGRAM(S): 5 TABLET, FILM COATED ORAL at 10:23

## 2021-05-19 RX ADMIN — ISOSORBIDE MONONITRATE 30 MILLIGRAM(S): 60 TABLET, EXTENDED RELEASE ORAL at 08:56

## 2021-05-19 RX ADMIN — CLOPIDOGREL BISULFATE 75 MILLIGRAM(S): 75 TABLET, FILM COATED ORAL at 10:23

## 2021-05-19 RX ADMIN — Medication 100 MILLIGRAM(S): at 10:23

## 2021-05-19 RX ADMIN — CARVEDILOL PHOSPHATE 3.12 MILLIGRAM(S): 80 CAPSULE, EXTENDED RELEASE ORAL at 08:57

## 2021-05-19 RX ADMIN — Medication 2: at 07:01

## 2021-05-19 RX ADMIN — Medication 1 MILLIGRAM(S): at 10:23

## 2021-05-19 RX ADMIN — Medication 5 MILLIGRAM(S): at 10:23

## 2021-05-19 RX ADMIN — Medication 81 MILLIGRAM(S): at 10:23

## 2021-05-19 NOTE — DISCHARGE NOTE NURSING/CASE MANAGEMENT/SOCIAL WORK - NSDCFUADDAPPT_GEN_ALL_CORE_FT
Please follow up with your cardiologist Dr. Rao within 1-2 weeks of discharge    We have provided you with a prescription for cardiac rehab which is medically supervised exercise program for your heart and has been shown to improve the quantity and quality of life of people with heart disease like yours. You should attend cardiac rehab 3 times per week for 12 weeks. We have provided you with a list of nearby facilities. Please call your insurance carrier to determine which of these facilities are covered under your plan. Please bring this prescription with you to your follow up appointment with your cardiologist who can then further assist you to enroll into a cardiac rehab program.

## 2021-05-19 NOTE — DISCHARGE NOTE PROVIDER - NSDCCPCAREPLAN_GEN_ALL_CORE_FT
PRINCIPAL DISCHARGE DIAGNOSIS  Diagnosis: CAD (coronary artery disease)  Assessment and Plan of Treatment: -You underwent a cardiac catheterization (5/18/2021) and the blockage in your Right Coronary Artery was opened with stent placement.  -Please make sure you take Aspirin 81 mg daily and Plavix 75 mg daily to keep the stent open in your heart. DO NOT STOP THESE TWO MEDICATIONS UNLESS INSTRUCTED TO DO SO BY YOUR CARDIOLOGIST. IF YOU STOP TAKING ASPIRIN AND PLAVIX YOU ARE AT RISK OF YOUR STENT CLOSING AND HAVING A HEART ATTACK.   -Your procedure was done through your wrist nad groin   -You do not need to keep this area covered and you may shower  -Please avoid any heavy lifting  (no more than 3 to 5 lbs) or strenuous activity for five days   -If you develop any swelling, bleeding, hardening of the skin (hematoma formation), acute pain, numbness/tingling  in your arm please contact your doctor immediately or call our 24/7 line: [phone]   -Please follow up with Dr. Rao within 1-2 weeks of discharge for a check up on your heart.   - We have provided you with a prescription for cardiac rehab which is a medically supervised exercise program for your heart and has been shown to improve the quantity and quality of life of people with heart disease like yours.   - You should attend cardiac rehab 3 times per week for 12 weeks.   - We have provided you with a list of nearby facilities.   -Please call your insurance carrier to determine which of these facilities are covered under your plan.   - Please bring this prescription with you to your follow up appointment with your cardiologist who can then further assist you to enroll into a cardiac rehab program.      SECONDARY DISCHARGE DIAGNOSES  Diagnosis: Hypertension  Assessment and Plan of Treatment: -Your blood pressure has been elevated while you were admitted to the hospital.   -Please INCREASE your Coreg to 6.25 mg orally twice a day and continue your Lasix 40 mg daily.   -Please follow up with Dr. Rao within 1-2 weeks for a blood pressure check up.    Diagnosis: Hyperlipidemia  Assessment and Plan of Treatment: -Please INCREASE your Lipitor to 40 mg daily to prevent further plaque build up in your arteries.    Diagnosis: History of BPH  Assessment and Plan of Treatment:     Diagnosis: Seizures  Assessment and Plan of Treatment:      PRINCIPAL DISCHARGE DIAGNOSIS  Diagnosis: CAD (coronary artery disease)  Assessment and Plan of Treatment: -You underwent a cardiac catheterization (5/18/2021) and the blockage in your Right Coronary Artery was opened with stent placement.  -Please make sure you take Aspirin 81 mg daily and Plavix 75 mg daily to keep the stent open in your heart. DO NOT STOP THESE TWO MEDICATIONS UNLESS INSTRUCTED TO DO SO BY YOUR CARDIOLOGIST. IF YOU STOP TAKING ASPIRIN AND PLAVIX YOU ARE AT RISK OF YOUR STENT CLOSING AND HAVING A HEART ATTACK.   -Your procedure was done through your wrist AND groin   -You do not need to keep this area covered and you may shower  -Please avoid any heavy lifting  (no more than 3 to 5 lbs) or strenuous activity for five days   -If you develop any swelling, bleeding, hardening of the skin (hematoma formation), acute pain, numbness/tingling  in your arm please contact your doctor immediately or call our 24/7 line: [phone]   -Please follow up with Dr. Rao within 1-2 weeks of discharge for a check up on your heart.   - We have provided you with a prescription for cardiac rehab which is a medically supervised exercise program for your heart and has been shown to improve the quantity and quality of life of people with heart disease like yours.   - You should attend cardiac rehab 3 times per week for 12 weeks.   - We have provided you with a list of nearby facilities.   -Please call your insurance carrier to determine which of these facilities are covered under your plan.   - Please bring this prescription with you to your follow up appointment with your cardiologist who can then further assist you to enroll into a cardiac rehab program.      SECONDARY DISCHARGE DIAGNOSES  Diagnosis: Hypertension  Assessment and Plan of Treatment: -Your blood pressure has been elevated while you were admitted to the hospital.   -Please INCREASE your Coreg to 6.25 mg orally twice a day.  -Please continue your Lasix 40 mg daily and Imdur 30 mg daily   -Please follow up with Dr. Rao within 1-2 weeks for a blood pressure check up.    Diagnosis: Hyperlipidemia  Assessment and Plan of Treatment: -Please INCREASE your Lipitor to 40 mg daily to prevent further plaque build up in your arteries.      Diagnosis: History of BPH  Assessment and Plan of Treatment: -Please continue your Tamsulosin and Finasteride daily.    Diagnosis: Diabetes  Assessment and Plan of Treatment: -Your Hemoglobin A1c is 7.0%. Your goal Hemoglobin A1c is less than 7.0%.   -This number measures your average blood sugar level over the last three months.  -Ways to lower this number include: diet, exercise, monitoring your fingersticks, adhering to your medication regimen and regular follow up during you Endocrinologist/Primary Care Doctor.   - DO NOT TAKE YOUR METFORMIN FOR TWO DAYS: RESTART 5/21/2021*****    Diagnosis: Daily consumption of alcohol  Assessment and Plan of Treatment: -We recommed you abstain from alochol use. This can be damaging to your heart and the rest of your vital organs.   -Please continue your Vitamin B1, Vitamin B12 and Folic Acid supplementation        PRINCIPAL DISCHARGE DIAGNOSIS  Diagnosis: CAD (coronary artery disease)  Assessment and Plan of Treatment: -You underwent a cardiac catheterization (5/18/2021) and the blockage in your Right Coronary Artery was opened with stent placement.  -Please make sure you take Aspirin 81 mg daily and Plavix 75 mg daily to keep the stent open in your heart. DO NOT STOP THESE TWO MEDICATIONS UNLESS INSTRUCTED TO DO SO BY YOUR CARDIOLOGIST. IF YOU STOP TAKING ASPIRIN AND PLAVIX YOU ARE AT RISK OF YOUR STENT CLOSING AND HAVING A HEART ATTACK.   -Your procedure was done through your wrist AND groin   -You do not need to keep this area covered and you may shower  -Please avoid any heavy lifting  (no more than 3 to 5 lbs) or strenuous activity for five days   -If you develop any swelling, bleeding, hardening of the skin (hematoma formation), acute pain, numbness/tingling  in your arm please contact your doctor immediately or call our 24/7 line: [phone]   -Please follow up with Dr. Rao within 1-2 weeks of discharge for a check up on your heart.   - We have provided you with a prescription for cardiac rehab which is a medically supervised exercise program for your heart and has been shown to improve the quantity and quality of life of people with heart disease like yours.   - You should attend cardiac rehab 3 times per week for 12 weeks.   - We have provided you with a list of nearby facilities.   -Please call your insurance carrier to determine which of these facilities are covered under your plan.   - Please bring this prescription with you to your follow up appointment with your cardiologist who can then further assist you to enroll into a cardiac rehab program.      SECONDARY DISCHARGE DIAGNOSES  Diagnosis: Hypertension  Assessment and Plan of Treatment: -Your blood pressure has been elevated while you were admitted to the hospital.   -Please INCREASE your Coreg to 6.25 mg orally twice a day.  -Please continue your Lasix 40 mg daily and Imdur 30 mg daily   -Please follow up with Dr. Rao within 1-2 weeks for a blood pressure check up.    Diagnosis: Hyperlipidemia  Assessment and Plan of Treatment: -Please INCREASE your Lipitor to 40 mg daily to prevent further plaque build up in your arteries.    Diagnosis: History of BPH  Assessment and Plan of Treatment: -Please continue your Tamsulosin and Finasteride daily.    Diagnosis: Diabetes  Assessment and Plan of Treatment: -Your Hemoglobin A1c is 7.0%. Your goal Hemoglobin A1c is less than 7.0%.   -This number measures your average blood sugar level over the last three months.  -Ways to lower this number include: diet, exercise, monitoring your fingersticks, adhering to your medication regimen and regular follow up during you Endocrinologist/Primary Care Doctor.   -DO NOT TAKE your Metformin for two days. You may resume taking your Metformin 5/21/21  -This medication can interact with the contrast used during your procedure therefore we want to ensure the contrast has left your body prior to you restarting your Metformin.    -Make sure you monitor your finger sticks and diet while you are not taking your Metformin!    Diagnosis: Daily consumption of alcohol  Assessment and Plan of Treatment: -We recommed you abstain from alochol use. This can be damaging to your heart and the rest of your vital organs.   -Please continue your Vitamin B1, Vitamin B12 and Folic Acid supplementation        PRINCIPAL DISCHARGE DIAGNOSIS  Diagnosis: CAD (coronary artery disease)  Assessment and Plan of Treatment: -You underwent a cardiac catheterization (5/18/2021) and the blockage in your Right Coronary Artery was opened with stent placement.  -Please make sure you take Aspirin 81 mg daily and Plavix 75 mg daily to keep the stent open in your heart. DO NOT STOP THESE TWO MEDICATIONS UNLESS INSTRUCTED TO DO SO BY YOUR CARDIOLOGIST. IF YOU STOP TAKING ASPIRIN AND PLAVIX YOU ARE AT RISK OF YOUR STENT CLOSING AND HAVING A HEART ATTACK.   -Your procedure was done through your wrist AND groin   -You do not need to keep this area covered and you may shower  -Please avoid any heavy lifting  (no more than 3 to 5 lbs) or strenuous activity for five days   -If you develop any swelling, bleeding, hardening of the skin (hematoma formation), acute pain, numbness/tingling  in your arm please contact your doctor immediately or call our 24/7 line: [phone]   -Please follow up with Dr. Rao within 1-2 weeks of discharge for a check up on your heart.   - We have provided you with a prescription for cardiac rehab which is a medically supervised exercise program for your heart and has been shown to improve the quantity and quality of life of people with heart disease like yours.   - You should attend cardiac rehab 3 times per week for 12 weeks.   - We have provided you with a list of nearby facilities.   -Please call your insurance carrier to determine which of these facilities are covered under your plan.   - Please bring this prescription with you to your follow up appointment with your cardiologist who can then further assist you to enroll into a cardiac rehab program.      SECONDARY DISCHARGE DIAGNOSES  Diagnosis: Hypertension  Assessment and Plan of Treatment: -Your blood pressure has been elevated while you were admitted to the hospital.   -Please INCREASE your Coreg to 6.25 mg orally twice a day. This has been sent to your pharmacy  -Please continue your Lasix 40 mg daily and Imdur 30 mg daily   -Please follow up with Dr. Rao within 1-2 weeks for a blood pressure check up.    Diagnosis: Hyperlipidemia  Assessment and Plan of Treatment: -Please INCREASE your Lipitor to 40 mg daily to prevent further plaque build up in your arteries. This has been sent to your pharmacy    Diagnosis: History of BPH  Assessment and Plan of Treatment: -Please continue your Tamsulosin and Finasteride daily.    Diagnosis: Diabetes  Assessment and Plan of Treatment: -Your Hemoglobin A1c is 7.0. Your goal Hemoglobin A1c is less than 7.0.   -This number measures your average blood sugar level over the last three months.  -Ways to lower this number include: diet, exercise, monitoring your fingersticks, adhering to your medication regimen and regular follow up during you Endocrinologist/Primary Care Doctor.   -DO NOT TAKE your Metformin for two days. You may resume taking your Metformin 5/21/21  -This medication can interact with the contrast used during your procedure therefore we want to ensure the contrast has left your body prior to you restarting your Metformin.    -Make sure you monitor your finger sticks and diet while you are not taking your Metformin!    Diagnosis: Daily consumption of alcohol  Assessment and Plan of Treatment: -We recommed you abstain from alochol use. This can be damaging to your heart and the rest of your vital organs.   -Please continue your Vitamin B1, Vitamin B12 and Folic Acid supplementation

## 2021-05-19 NOTE — DISCHARGE NOTE PROVIDER - CARE PROVIDER_API CALL
Evie Rao  Please follow up within your cardiologist within 1-2 weeks of discharge for a check up on your heart!  Phone: [phone]  Fax: (   )    -  Follow Up Time: 2 weeks

## 2021-05-19 NOTE — DISCHARGE NOTE PROVIDER - NSDCFUADDAPPT_GEN_ALL_CORE_FT
Please follow up with your cardiologist Dr. Rao within 1-2 weeks of discharge  Please follow up with your cardiologist Dr. Rao within 1-2 weeks of discharge    We have provided you with a prescription for cardiac rehab which is medically supervised exercise program for your heart and has been shown to improve the quantity and quality of life of people with heart disease like yours. You should attend cardiac rehab 3 times per week for 12 weeks. We have provided you with a list of nearby facilities. Please call your insurance carrier to determine which of these facilities are covered under your plan. Please bring this prescription with you to your follow up appointment with your cardiologist who can then further assist you to enroll into a cardiac rehab program.

## 2021-05-19 NOTE — DISCHARGE NOTE NURSING/CASE MANAGEMENT/SOCIAL WORK - PATIENT PORTAL LINK FT
You can access the FollowMyHealth Patient Portal offered by [facility] by registering at the following website: [email or web address]. By joining [facility]’s FollowMyHealth portal, you will also be able to view your health information using other applications (apps) compatible with our system.

## 2021-05-19 NOTE — DISCHARGE NOTE PROVIDER - HOSPITAL COURSE
79 yo M [place], POOR HISTORIAN w/ Alcohol abuse w/ alcholic seizure (was on keppra which has been d/c by his PCP 1 year ago), former smoker and  PMHx HTN, HLD, DM, BPH, HFmEF (most recent EF 47% from NST 03/2021) presented to Cardiologist, Dr Erin Barber, endorsing LE swelling and pain with exertion worsening over the last few months. Pt states his exercise tolerance is 3 blocks. Pt also feels lightheaded when he goes from a seated to standing position.  Of note pt was recently changed from Norvasc and Metoprolol to Carvedilol. Pt not on an ACE/ARB 2/2 elevated K as outpt.  NST on 03/13/21 showed a medium sized, mild to moderate severity, partially reversible defect exists in the proximal and distal inferior segments and apex. Pt then underwent a diagnostic cardiac catheterization at [facility] on 04/23/21 with unsuccessful intervention to distal RCA 80%, severely calcified. Residual disease includes distal LAD diffusely disease 80%. Mild luminal irregularities in p/m LAD, RPDA.  In light of pt's risk factors, CCS Class II anginal symptoms, abnormal NST, known distal RCA and LAD disease patient was referred for staged PCI. Patient is now s/p cardiac catheterization (5/18/2021) and received rotablator/KEVEN/shockwave lithotripsy of mid RCA. Procedure done via right radial access and right CFV access (5F). Patient was admitted overnight for monitoring and has been seen and examined at bedside this morning. Patient with no complaints and is out of bed ambulating. Right radial access and right groin access stable this AM (no hematoma, no bleed, pulses intact). Lab values reviewed (Magnesium level of 1.5 supplemented with Magnesium Sulfate 2 grams IV x one dose),  telemetry with no events and vital signs reviewed revealing  elevated SBP trend of 160-200 mmHG yesterday and SBP trend of 142-177 this AM, HR 60-70s with plan to increase Coreg to 6.25 mg orally BID. Discharge medication regimen reviewed with Dr. Hernandez with plan for patient to be discharged home on ASA 81 mg daily, Plavix 75 mg daily, Lasix 40 mg daily, Imdur 30 mg daily, Coreg increased to 6.25 mg orally BID and Lipitor increased to 40 mg daily. Patient has been cleared for discharge at this time per Dr. Hernandez and will follow up with Dr. Rao within 1-2 weeks of discharge for a post PCI check up and BP check up. All discharge instructions and medications have been reviewed with patient. Medications have been e-prescribed to patient's preferred pharmacy.         Referral and prescription given for cardiac rehab. Patient given a list of locations and instructed to contact their insurance company to review participating providers in their network. Patient instructed to bring prescription with them to their follow up appointment with their cardiolgoist who can further assist in cardiac rehab enrollement.  Education on benefits of cardiac rehab provided to patient.      79 yo M [place], POOR HISTORIAN w/ Alcohol abuse w/ alcholic seizure (was on keppra which has been d/c by his PCP 1 year ago), former smoker and  PMHx HTN, HLD, DM, BPH, HFmEF (most recent EF 47% from NST 03/2021) presented to Cardiologist, Dr Erin Barber, endorsing LE swelling and pain with exertion worsening over the last few months. Pt states his exercise tolerance is 3 blocks. Pt also feels lightheaded when he goes from a seated to standing position.  Of note pt was recently changed from Norvasc and Metoprolol to Carvedilol. Pt not on an ACE/ARB 2/2 elevated K as outpt.  NST on 03/13/21 showed a medium sized, mild to moderate severity, partially reversible defect exists in the proximal and distal inferior segments and apex. Pt then underwent a diagnostic cardiac catheterization at [facility] on 04/23/21 with unsuccessful intervention to distal RCA 80%, severely calcified. Residual disease includes distal LAD diffusely disease 80%. Mild luminal irregularities in p/m LAD, RPDA.  In light of pt's risk factors, CCS Class II anginal symptoms, abnormal NST, known distal RCA and LAD disease patient was referred for staged PCI. Patient is now s/p cardiac catheterization (5/18/2021) and received rotablator/KEVEN/shockwave lithotripsy of mid RCA. Procedure done via right radial access and right CFV access (5F). Patient was admitted overnight for monitoring and has been seen and examined at bedside this morning. Patient with no complaints and is out of bed ambulating. Right radial access and right groin access stable this AM (no hematoma, no bleed, pulses intact). Lab values reviewed (Magnesium level of 1.5 supplemented with Magnesium Sulfate 2 grams IV x one dose),  telemetry with no events and vital signs reviewed revealing  elevated SBP trend of 160-200 mmHG yesterday and SBP trend of 142-177 this AM, HR 60-70s with plan to increase Coreg to 6.25 mg orally BID. Discharge medication regimen reviewed with Dr. Heranndez with plan for patient to be discharged home on ASA 81 mg daily, Plavix 75 mg daily, Lasix 40 mg daily, Imdur 30 mg daily, Coreg increased to 6.25 mg orally BID and Lipitor increased to 40 mg daily. Patient has been cleared for discharge at this time per Dr. Hernandez and will follow up with Dr. Rao within 1-2 weeks of discharge for a post PCI check up and BP check up. All discharge instructions and medications have been reviewed with patient. Medications have been e-prescribed to patient's preferred pharmacy.         Referral and prescription given for cardiac rehab. Patient given a list of locations and instructed to contact their insurance company to review participating providers in their network. Patient instructed to bring prescription with them to their follow up appointment with their cardiolgoist who can further assist in cardiac rehab enrollement.  Education on benefits of cardiac rehab provided to patient.        · 	folic acid 1 mg oral tablet: Last Dose Taken:  , 1 tab(s) orally once a day  · 	oxybutynin 5 mg oral tablet: Last Dose Taken:  , orally once a day  · 	tamsulosin 0.4 mg oral capsule: Last Dose Taken:  , 1 cap(s) orally once a day  · 	Lipitor 20 mg oral tablet: Last Dose Taken:  , 1 tab(s) orally once a day  · 	aspirin 81 mg oral tablet: Last Dose Taken:  , orally once a day  · 	finasteride 5 mg oral tablet: Last Dose Taken:  , 1 tab(s) orally once a day  · 	Vitamin B1 100 mg oral tablet: Last Dose Taken:  , 1 tab(s) orally once a day  · 	Vitamin D3 5000 intl units (125 mcg) oral capsule: Last Dose Taken:    · 	Vitamin B12 250 mcg oral tablet: Last Dose Taken:  , 1 tab(s) orally once a day     79 yo M [place], POOR HISTORIAN w/ Alcohol abuse w/ alcholic seizure (was on keppra which has been d/c by his PCP 1 year ago), former smoker and  PMHx HTN, HLD, DM, BPH, HFmEF (most recent EF 47% from NST 03/2021) presented to Cardiologist, Dr Erin Barber, endorsing LE swelling and pain with exertion worsening over the last few months. Pt states his exercise tolerance is 3 blocks. Pt also feels lightheaded when he goes from a seated to standing position.  Of note pt was recently changed from Norvasc and Metoprolol to Carvedilol. Pt not on an ACE/ARB 2/2 elevated K as outpt.  NST on 03/13/21 showed a medium sized, mild to moderate severity, partially reversible defect exists in the proximal and distal inferior segments and apex. Pt then underwent a diagnostic cardiac catheterization at [facility] on 04/23/21 with unsuccessful intervention to distal RCA 80%, severely calcified. Residual disease includes distal LAD diffusely disease 80%. Mild luminal irregularities in p/m LAD, RPDA.  In light of pt's risk factors, CCS Class II anginal symptoms, abnormal NST, known distal RCA and LAD disease patient was referred for staged PCI. Of note, pt was pre-medicated w/ Prednisone 50mg q6hrs x3 pre-cath and Diphenhydramine 50mg IV x1 on the cath lab table for contrast allergy. Patient is now s/p cardiac catheterization (5/18/2021) and received rotablator/KEVEN/shockwave lithotripsy of mid RCA. Procedure done via right radial access and right CFV access (5F). Patient was admitted overnight for monitoring and has been seen and examined at bedside this morning. Patient with no complaints and is out of bed ambulating. Right radial access and right groin access stable this AM (no hematoma, no bleed, pulses intact). Lab values reviewed (Magnesium level of 1.5 supplemented with Magnesium Sulfate 2 grams IV x one dose),  telemetry with no events and vital signs reviewed revealing  elevated SBP trend of 160-200 mmHG yesterday and SBP trend of 142-177 this AM, HR 60-70s with plan to increase Coreg to 6.25 mg orally BID. Discharge medication regimen reviewed with Dr. Hernandez with plan for patient to be discharged home on ASA 81 mg daily, Plavix 75 mg daily, Lasix 40 mg daily, Imdur 30 mg daily, Coreg increased to 6.25 mg orally BID and Lipitor increased to 40 mg daily. Pt instructed to hold their Metformin until 5/21/21. Patient has been cleared for discharge at this time per Dr. Hernandez and will follow up with Dr. Rao within 1-2 weeks of discharge for a post PCI check up and BP check up. All discharge instructions and medications have been reviewed with patient. Medications have been e-prescribed to patient's preferred pharmacy.        Referral and prescription given for cardiac rehab. Patient given a list of locations and instructed to contact their insurance company to review participating providers in their network. Patient instructed to bring prescription with them to their follow up appointment with their cardiolgoist who can further assist in cardiac rehab enrollement.  Education on benefits of cardiac rehab provided to patient.        · 	folic acid 1 mg oral tablet: Last Dose Taken:  , 1 tab(s) orally once a day  · 	oxybutynin 5 mg oral tablet: Last Dose Taken:  , orally once a day  · 	tamsulosin 0.4 mg oral capsule: Last Dose Taken:  , 1 cap(s) orally once a day  · 	Lipitor 20 mg oral tablet: Last Dose Taken:  , 1 tab(s) orally once a day  · 	aspirin 81 mg oral tablet: Last Dose Taken:  , orally once a day  · 	finasteride 5 mg oral tablet: Last Dose Taken:  , 1 tab(s) orally once a day  · 	Vitamin B1 100 mg oral tablet: Last Dose Taken:  , 1 tab(s) orally once a day  · 	Vitamin D3 5000 intl units (125 mcg) oral capsule: Last Dose Taken:    · 	Vitamin B12 250 mcg oral tablet: Last Dose Taken:  , 1 tab(s) orally once a day

## 2021-05-19 NOTE — DISCHARGE NOTE PROVIDER - NSDCFUADDINST_GEN_ALL_CORE_FT
-Your procedure was done through your wrist  -You do not need to keep this area covered and you may shower  -Please avoid any heavy lifting  (no more than 3 to 5 lbs) or strenuous activity for five days   -If you develop any swelling, bleeding, hardening of the skin (hematoma formation), acute pain, numbness/tingling  in your arm please contact your doctor immediately or call our 24/7 line: [phone]

## 2021-05-19 NOTE — DISCHARGE NOTE PROVIDER - PROVIDER TOKENS
FREE:[LAST:[Asti],FIRST:[Evie],PHONE:[phone]],FAX:[phone]   [phone]    -],ADDRESS:[Please follow up within your cardiologist within 1-2 weeks of discharge for a check up on your heart!],FOLLOWUP:[2 weeks]]

## 2021-05-19 NOTE — DISCHARGE NOTE PROVIDER - NSDCMRMEDTOKEN_GEN_ALL_CORE_FT
aspirin 81 mg oral tablet: orally once a day  Coreg 3.125 mg oral tablet: 1 tab(s) orally 2 times a day  finasteride 5 mg oral tablet: 1 tab(s) orally once a day  folic acid 1 mg oral tablet: 1 tab(s) orally once a day  Imdur 30 mg oral tablet, extended release: 1 tab(s) orally once a day (in the morning)  Lasix 40 mg oral tablet: 1 tab(s) orally once a day  Lipitor 20 mg oral tablet: 1 tab(s) orally once a day  metFORMIN 500 mg oral tablet: 1 tab(s) orally 2 times a day  oxybutynin 5 mg oral tablet: orally once a day  Plavix 75 mg oral tablet: 1 tab(s) orally once a day  tamsulosin 0.4 mg oral capsule: 1 cap(s) orally once a day  Vitamin B1 100 mg oral tablet: 1 tab(s) orally once a day  Vitamin B12 250 mcg oral tablet: 1 tab(s) orally once a day  Vitamin D3 5000 intl units (125 mcg) oral capsule:    aspirin 81 mg oral tablet: 1 tab(s) orally once a day   Cardiac Rehabilitation:   Coreg 6.25 mg oral tablet: 1 tab(s) orally 2 times a day   finasteride 5 mg oral tablet: 1 tab(s) orally once a day  folic acid 1 mg oral tablet: 1 tab(s) orally once a day  Imdur 30 mg oral tablet, extended release: 1 tab(s) orally once a day (in the morning)  Lasix 40 mg oral tablet: 1 tab(s) orally once a day  Lipitor 40 mg oral tablet: 1 tab(s) orally once a day (at bedtime)   metFORMIN 500 mg oral tablet: 1 tab(s) orally 2 times a day  oxybutynin 5 mg oral tablet: orally once a day  Plavix 75 mg oral tablet: 1 tab(s) orally once a day  tamsulosin 0.4 mg oral capsule: 1 cap(s) orally once a day  Vitamin B1 100 mg oral tablet: 1 tab(s) orally once a day  Vitamin B12 250 mcg oral tablet: 1 tab(s) orally once a day  Vitamin D3 5000 intl units (125 mcg) oral capsule:

## 2021-06-02 DIAGNOSIS — N40.0 BENIGN PROSTATIC HYPERPLASIA WITHOUT LOWER URINARY TRACT SYMPTOMS: ICD-10-CM

## 2021-06-02 DIAGNOSIS — Z79.02 LONG TERM (CURRENT) USE OF ANTITHROMBOTICS/ANTIPLATELETS: ICD-10-CM

## 2021-06-02 DIAGNOSIS — E78.5 HYPERLIPIDEMIA, UNSPECIFIED: ICD-10-CM

## 2021-06-02 DIAGNOSIS — I50.22 CHRONIC SYSTOLIC (CONGESTIVE) HEART FAILURE: ICD-10-CM

## 2021-06-02 DIAGNOSIS — Z91.041 RADIOGRAPHIC DYE ALLERGY STATUS: ICD-10-CM

## 2021-06-02 DIAGNOSIS — I11.0 HYPERTENSIVE HEART DISEASE WITH HEART FAILURE: ICD-10-CM

## 2021-06-02 DIAGNOSIS — Z79.84 LONG TERM (CURRENT) USE OF ORAL HYPOGLYCEMIC DRUGS: ICD-10-CM

## 2021-06-02 DIAGNOSIS — E11.9 TYPE 2 DIABETES MELLITUS WITHOUT COMPLICATIONS: ICD-10-CM

## 2021-06-02 DIAGNOSIS — I25.10 ATHEROSCLEROTIC HEART DISEASE OF NATIVE CORONARY ARTERY WITHOUT ANGINA PECTORIS: ICD-10-CM

## 2021-06-02 DIAGNOSIS — Z87.891 PERSONAL HISTORY OF NICOTINE DEPENDENCE: ICD-10-CM

## 2021-06-02 DIAGNOSIS — F10.10 ALCOHOL ABUSE, UNCOMPLICATED: ICD-10-CM

## 2021-06-02 DIAGNOSIS — I25.84 CORONARY ATHEROSCLEROSIS DUE TO CALCIFIED CORONARY LESION: ICD-10-CM

## 2021-06-02 DIAGNOSIS — Z91.013 ALLERGY TO SEAFOOD: ICD-10-CM

## 2021-06-02 DIAGNOSIS — I25.119 ATHEROSCLEROTIC HEART DISEASE OF NATIVE CORONARY ARTERY WITH UNSPECIFIED ANGINA PECTORIS: ICD-10-CM

## 2021-06-02 DIAGNOSIS — Z79.82 LONG TERM (CURRENT) USE OF ASPIRIN: ICD-10-CM
